# Patient Record
Sex: MALE | Race: WHITE
[De-identification: names, ages, dates, MRNs, and addresses within clinical notes are randomized per-mention and may not be internally consistent; named-entity substitution may affect disease eponyms.]

---

## 2023-11-24 ENCOUNTER — OFFICE VISIT (OUTPATIENT)
Dept: FAMILY MEDICINE CLINIC | Facility: CLINIC | Age: 28
End: 2023-11-24
Payer: COMMERCIAL

## 2023-11-24 VITALS
HEIGHT: 64 IN | WEIGHT: 143.8 LBS | HEART RATE: 72 BPM | BODY MASS INDEX: 24.55 KG/M2 | SYSTOLIC BLOOD PRESSURE: 132 MMHG | TEMPERATURE: 98.3 F | OXYGEN SATURATION: 100 % | DIASTOLIC BLOOD PRESSURE: 88 MMHG

## 2023-11-24 DIAGNOSIS — G47.9 SLEEP DISTURBANCE: Primary | ICD-10-CM

## 2023-11-24 LAB
ALBUMIN SERPL-MCNC: 3.7 G/DL (ref 3.5–5)
ALBUMIN/GLOB SERPL: 1.3 (ref 0.4–1.6)
ALP SERPL-CCNC: 73 U/L (ref 50–136)
ALT SERPL-CCNC: 37 U/L (ref 12–65)
ANION GAP SERPL CALC-SCNC: 4 MMOL/L (ref 2–11)
AST SERPL-CCNC: 27 U/L (ref 15–37)
BASOPHILS # BLD: 0 K/UL (ref 0–0.2)
BASOPHILS NFR BLD: 1 % (ref 0–2)
BILIRUB SERPL-MCNC: 0.2 MG/DL (ref 0.2–1.1)
BUN SERPL-MCNC: 23 MG/DL (ref 6–23)
CALCIUM SERPL-MCNC: 9.3 MG/DL (ref 8.3–10.4)
CHLORIDE SERPL-SCNC: 110 MMOL/L (ref 101–110)
CO2 SERPL-SCNC: 27 MMOL/L (ref 21–32)
CREAT SERPL-MCNC: 1.2 MG/DL (ref 0.8–1.5)
DIFFERENTIAL METHOD BLD: NORMAL
EOSINOPHIL # BLD: 0.1 K/UL (ref 0–0.8)
EOSINOPHIL NFR BLD: 2 % (ref 0.5–7.8)
ERYTHROCYTE [DISTWIDTH] IN BLOOD BY AUTOMATED COUNT: 13.1 % (ref 11.9–14.6)
FERRITIN SERPL-MCNC: 101 NG/ML (ref 8–388)
GLOBULIN SER CALC-MCNC: 2.9 G/DL (ref 2.8–4.5)
GLUCOSE SERPL-MCNC: 86 MG/DL (ref 65–100)
HCT VFR BLD AUTO: 42.9 % (ref 41.1–50.3)
HGB BLD-MCNC: 14.3 G/DL (ref 13.6–17.2)
IMM GRANULOCYTES # BLD AUTO: 0 K/UL (ref 0–0.5)
IMM GRANULOCYTES NFR BLD AUTO: 0 % (ref 0–5)
LYMPHOCYTES # BLD: 2 K/UL (ref 0.5–4.6)
LYMPHOCYTES NFR BLD: 32 % (ref 13–44)
MCH RBC QN AUTO: 30 PG (ref 26.1–32.9)
MCHC RBC AUTO-ENTMCNC: 33.3 G/DL (ref 31.4–35)
MCV RBC AUTO: 89.9 FL (ref 82–102)
MONOCYTES # BLD: 0.4 K/UL (ref 0.1–1.3)
MONOCYTES NFR BLD: 7 % (ref 4–12)
NEUTS SEG # BLD: 3.6 K/UL (ref 1.7–8.2)
NEUTS SEG NFR BLD: 58 % (ref 43–78)
NRBC # BLD: 0 K/UL (ref 0–0.2)
PLATELET # BLD AUTO: 317 K/UL (ref 150–450)
PMV BLD AUTO: 9.5 FL (ref 9.4–12.3)
POTASSIUM SERPL-SCNC: 3.8 MMOL/L (ref 3.5–5.1)
PROT SERPL-MCNC: 6.6 G/DL (ref 6.3–8.2)
RBC # BLD AUTO: 4.77 M/UL (ref 4.23–5.6)
SODIUM SERPL-SCNC: 141 MMOL/L (ref 133–143)
TSH W FREE THYROID IF ABNORMAL: 2.06 UIU/ML (ref 0.36–3.74)
WBC # BLD AUTO: 6.2 K/UL (ref 4.3–11.1)

## 2023-11-24 PROCEDURE — 99214 OFFICE O/P EST MOD 30 MIN: CPT | Performed by: PHYSICIAN ASSISTANT

## 2023-11-24 RX ORDER — TRAZODONE HYDROCHLORIDE 50 MG/1
50 TABLET ORAL NIGHTLY PRN
Qty: 30 TABLET | Refills: 2 | Status: SHIPPED | OUTPATIENT
Start: 2023-11-24

## 2023-11-24 SDOH — ECONOMIC STABILITY: FOOD INSECURITY: WITHIN THE PAST 12 MONTHS, THE FOOD YOU BOUGHT JUST DIDN'T LAST AND YOU DIDN'T HAVE MONEY TO GET MORE.: PATIENT DECLINED

## 2023-11-24 SDOH — ECONOMIC STABILITY: FOOD INSECURITY: WITHIN THE PAST 12 MONTHS, YOU WORRIED THAT YOUR FOOD WOULD RUN OUT BEFORE YOU GOT MONEY TO BUY MORE.: PATIENT DECLINED

## 2023-11-24 SDOH — ECONOMIC STABILITY: HOUSING INSECURITY
IN THE LAST 12 MONTHS, WAS THERE A TIME WHEN YOU DID NOT HAVE A STEADY PLACE TO SLEEP OR SLEPT IN A SHELTER (INCLUDING NOW)?: PATIENT REFUSED

## 2023-11-24 SDOH — ECONOMIC STABILITY: INCOME INSECURITY: HOW HARD IS IT FOR YOU TO PAY FOR THE VERY BASICS LIKE FOOD, HOUSING, MEDICAL CARE, AND HEATING?: PATIENT DECLINED

## 2023-11-24 ASSESSMENT — PATIENT HEALTH QUESTIONNAIRE - PHQ9
SUM OF ALL RESPONSES TO PHQ9 QUESTIONS 1 & 2: 0
1. LITTLE INTEREST OR PLEASURE IN DOING THINGS: 0
SUM OF ALL RESPONSES TO PHQ QUESTIONS 1-9: 0
SUM OF ALL RESPONSES TO PHQ QUESTIONS 1-9: 0
2. FEELING DOWN, DEPRESSED OR HOPELESS: 0
SUM OF ALL RESPONSES TO PHQ QUESTIONS 1-9: 0
SUM OF ALL RESPONSES TO PHQ QUESTIONS 1-9: 0

## 2023-11-24 ASSESSMENT — ENCOUNTER SYMPTOMS
SHORTNESS OF BREATH: 0
BLOOD IN STOOL: 0
COUGH: 0

## 2023-11-24 NOTE — PROGRESS NOTES
[address] [facility] [address]  Phone: [phone]  Fax [phone]  Provider : Gemma Thompson PA-C      Patient: Cici Tucker  YOB: 1995  Patient Age 34 y.o. Patient sex: male  Medical Record:  [id number]  Visit Date:11/24/2023   Author:  Sheila Leonard. [facility] Note     Chief complaint  Cici Tucker  is a 34 y.o. male who was seen on 11/27/23  8:33 AM  for the following reasons:    Chief Complaint   Patient presents with    New Patient     Has trouble with sleeping would like to discuss this- pt is not fasting-      New Patient   Current Medical problems addressed  Cookie Huang is 33 yo male who is  a new patient   He notes he works night shifts and has trouble falling asleep and sometimes taken unisom, melitonin and it helps sometimes. but struggles for hours to fall asleep. Denies snoring    ASSESSMENT AND PLAN    ICD-10-CM    1. Sleep disturbance  G47.9 CBC with Auto Differential     TSH with Reflex     Comprehensive Metabolic Panel     Ferritin     traZODone (DESYREL) 50 MG tablet     Ferritin     Comprehensive Metabolic Panel     TSH with Reflex     CBC with Auto Differential         Stephanie Felton was seen today for new patient. Diagnoses and all orders for this visit:    Sleep disturbance  -     CBC with Auto Differential; Future  -     TSH with Reflex; Future  -     Comprehensive Metabolic Panel; Future  -     Ferritin; Future  -     traZODone (DESYREL) 50 MG tablet; Take 1 tablet by mouth nightly as needed for Sleep  -     Ferritin  -     Comprehensive Metabolic Panel  -     TSH with Reflex  -     CBC with Auto Differential      Plan includes the following:  Reviewed sleep hygiene and recommended reducing caffeine intake and he notes he takes   No follow-up provider specified.   Orders Placed This Encounter   Procedures    CBC with Auto Differential     Standing Status:   Future     Number of Occurrences:   1     Standing Expiration

## 2023-12-09 ENCOUNTER — PATIENT MESSAGE (OUTPATIENT)
Dept: FAMILY MEDICINE CLINIC | Facility: CLINIC | Age: 28
End: 2023-12-09

## 2023-12-14 RX ORDER — TRAZODONE HYDROCHLORIDE 50 MG/1
75 TABLET ORAL NIGHTLY
Qty: 45 TABLET | Refills: 3 | Status: SHIPPED | OUTPATIENT
Start: 2023-12-14

## 2023-12-14 NOTE — TELEPHONE ENCOUNTER
From: Dre Godoy  To: Shanthi Burks  Sent: 12/9/2023 5:21 PM EST  Subject: My Trazodone medication     I had a check up a couple weeks ago and I was prescribed Trazodone for sleep. It works miracle but some night I'll take an addition melatonin or overcounter sleeping pill with it to aid my sleep.  My question is should I up my dosage and if so would I be prescribed for a little higher dosage

## 2024-02-20 RX ORDER — TRAZODONE HYDROCHLORIDE 50 MG/1
75 TABLET ORAL NIGHTLY
Qty: 45 TABLET | Refills: 3 | Status: SHIPPED | OUTPATIENT
Start: 2024-02-20

## 2024-04-16 RX ORDER — TRAZODONE HYDROCHLORIDE 50 MG/1
75 TABLET ORAL NIGHTLY
Qty: 45 TABLET | Refills: 3 | Status: SHIPPED | OUTPATIENT
Start: 2024-04-16

## 2024-06-17 DIAGNOSIS — G47.9 SLEEP DISTURBANCE: Primary | ICD-10-CM

## 2024-06-17 RX ORDER — TRAZODONE HYDROCHLORIDE 50 MG/1
75 TABLET ORAL NIGHTLY
Qty: 30 TABLET | Refills: 0 | Status: SHIPPED | OUTPATIENT
Start: 2024-06-17

## 2024-07-11 DIAGNOSIS — G47.9 SLEEP DISTURBANCE: ICD-10-CM

## 2024-07-11 RX ORDER — TRAZODONE HYDROCHLORIDE 50 MG/1
TABLET ORAL
Qty: 30 TABLET | Refills: 0 | Status: SHIPPED | OUTPATIENT
Start: 2024-07-11

## 2024-07-27 DIAGNOSIS — G47.9 SLEEP DISTURBANCE: ICD-10-CM

## 2024-07-29 DIAGNOSIS — G47.9 SLEEP DISTURBANCE: ICD-10-CM

## 2024-07-29 RX ORDER — TRAZODONE HYDROCHLORIDE 50 MG/1
TABLET, FILM COATED ORAL
Qty: 30 TABLET | Refills: 0 | OUTPATIENT
Start: 2024-07-29

## 2024-08-16 DIAGNOSIS — G47.9 SLEEP DISTURBANCE: ICD-10-CM

## 2024-08-16 RX ORDER — TRAZODONE HYDROCHLORIDE 50 MG/1
TABLET ORAL
Qty: 30 TABLET | Refills: 0 | Status: SHIPPED | OUTPATIENT
Start: 2024-08-16

## 2024-08-20 DIAGNOSIS — G47.9 SLEEP DISTURBANCE: ICD-10-CM

## 2024-08-20 RX ORDER — TRAZODONE HYDROCHLORIDE 50 MG/1
TABLET, FILM COATED ORAL
Qty: 30 TABLET | Refills: 0 | OUTPATIENT
Start: 2024-08-20

## 2024-09-30 DIAGNOSIS — G47.9 SLEEP DISTURBANCE: ICD-10-CM

## 2024-10-01 RX ORDER — TRAZODONE HYDROCHLORIDE 50 MG/1
TABLET, FILM COATED ORAL
Qty: 45 TABLET | Refills: 0 | Status: SHIPPED | OUTPATIENT
Start: 2024-10-01

## 2024-11-04 DIAGNOSIS — G47.9 SLEEP DISTURBANCE: ICD-10-CM

## 2024-11-04 RX ORDER — TRAZODONE HYDROCHLORIDE 50 MG/1
TABLET, FILM COATED ORAL
Qty: 45 TABLET | Refills: 0 | Status: SHIPPED | OUTPATIENT
Start: 2024-11-04

## 2024-11-13 DIAGNOSIS — G47.9 SLEEP DISTURBANCE: ICD-10-CM

## 2024-11-13 RX ORDER — TRAZODONE HYDROCHLORIDE 50 MG/1
TABLET, FILM COATED ORAL
Qty: 45 TABLET | Refills: 0 | OUTPATIENT
Start: 2024-11-13

## 2024-12-30 DIAGNOSIS — G47.9 SLEEP DISTURBANCE: ICD-10-CM

## 2024-12-30 RX ORDER — TRAZODONE HYDROCHLORIDE 50 MG/1
TABLET, FILM COATED ORAL
Qty: 45 TABLET | Refills: 0 | Status: SHIPPED | OUTPATIENT
Start: 2024-12-30

## 2024-12-30 NOTE — TELEPHONE ENCOUNTER
I sent the requested medication/product in. Please let patient know to check with their pharmacy  Thanks  Kirstin BALES    
Cooperative

## 2025-01-06 ENCOUNTER — PATIENT MESSAGE (OUTPATIENT)
Dept: FAMILY MEDICINE CLINIC | Facility: CLINIC | Age: 30
End: 2025-01-06

## 2025-01-08 NOTE — PROGRESS NOTES
[facility]  [address]   [place] [address]  Phone: [phone]  Fax [phone]  Provider : Kirstin Madrigal PA-C      Patient: Shemar Godoy  YOB: 1995  Patient Age 29 y.o.  Patient sex: male  Medical Record:  [id number]  Visit Date:1/9/2025   Author:  Kirstin Madrigal PA-C    Family Practice Clinic Note     Chief complaint  Shemar Godoy  is a 29 y.o. male who was seen on 01/09/25  10:21 AM  for the following reasons:    Chief Complaint   Patient presents with    Insomnia     Falling asleep, but not staying asleep       Current Medical problems addressed    todd is a 29-year-old male who returns today for follow-up for insomnia he was started on trazodone 50mg back in nov 2023 and then given increased dose of 1.5 tablets and struggling to sleep.  He works second shift so he was taking caffeine in the gym around 1pm and then again dose c4  (200mg of caffiene) energy drinks or capsules of caffeine and takes it around 5:00-5:30pm.  He lays down to sleep around 2am as he doesn't get off at 11pm or later if he works over.   He struggles to go to sleep   ASSESSMENT AND PLAN    ICD-10-CM    1. Primary insomnia  F51.01          Shemar \"Todd\" was seen today for insomnia.    Diagnoses and all orders for this visit:    Primary insomnia      Plan includes the following:  Advised to increase trazadone to 2tablets qhs and then try for 7-10 days and then if not helping can increase it to 3 tablet   He is using doxyalmine as sleep aid  Advised he can try magnesium glycinate.  Reviewed sleep hygiene and advised to avoid alcohol and reduce caffeine to 90mg at a time and cut off at least 10 hours prior to bedtime   No follow-up provider specified.  No orders of the defined types were placed in this encounter.      Past Medical History:  Allergies:No Known Allergies    Current Medications:   Medications marked \"taking\" at this time:  Current Outpatient Medications   Medication Sig

## 2025-01-09 ENCOUNTER — OFFICE VISIT (OUTPATIENT)
Dept: FAMILY MEDICINE CLINIC | Facility: CLINIC | Age: 30
End: 2025-01-09
Payer: COMMERCIAL

## 2025-01-09 VITALS
BODY MASS INDEX: 27.21 KG/M2 | SYSTOLIC BLOOD PRESSURE: 140 MMHG | WEIGHT: 159.4 LBS | TEMPERATURE: 98 F | OXYGEN SATURATION: 95 % | HEIGHT: 64 IN | HEART RATE: 80 BPM | DIASTOLIC BLOOD PRESSURE: 80 MMHG

## 2025-01-09 DIAGNOSIS — F51.01 PRIMARY INSOMNIA: Primary | ICD-10-CM

## 2025-01-09 PROCEDURE — 99213 OFFICE O/P EST LOW 20 MIN: CPT | Performed by: PHYSICIAN ASSISTANT

## 2025-01-09 RX ORDER — DIPHENHYDRAMINE HCL 25 MG
25 CAPSULE ORAL EVERY 6 HOURS PRN
COMMUNITY

## 2025-01-09 ASSESSMENT — PATIENT HEALTH QUESTIONNAIRE - PHQ9
1. LITTLE INTEREST OR PLEASURE IN DOING THINGS: NOT AT ALL
SUM OF ALL RESPONSES TO PHQ QUESTIONS 1-9: 0
SUM OF ALL RESPONSES TO PHQ9 QUESTIONS 1 & 2: 0
2. FEELING DOWN, DEPRESSED OR HOPELESS: NOT AT ALL
SUM OF ALL RESPONSES TO PHQ QUESTIONS 1-9: 0

## 2025-01-13 ASSESSMENT — ENCOUNTER SYMPTOMS
BLOOD IN STOOL: 0
COUGH: 0
SHORTNESS OF BREATH: 0

## 2025-01-20 DIAGNOSIS — G47.9 SLEEP DISTURBANCE: ICD-10-CM

## 2025-01-20 RX ORDER — TRAZODONE HYDROCHLORIDE 50 MG/1
TABLET ORAL
Qty: 45 TABLET | Refills: 0 | OUTPATIENT
Start: 2025-01-20

## 2025-01-30 ENCOUNTER — PATIENT MESSAGE (OUTPATIENT)
Dept: FAMILY MEDICINE CLINIC | Facility: CLINIC | Age: 30
End: 2025-01-30

## 2025-02-11 RX ORDER — TRAZODONE HYDROCHLORIDE 50 MG/1
TABLET, FILM COATED ORAL
Qty: 60 TABLET | Refills: 1 | Status: SHIPPED | OUTPATIENT
Start: 2025-02-11

## 2025-02-20 ENCOUNTER — OFFICE VISIT (OUTPATIENT)
Dept: FAMILY MEDICINE CLINIC | Facility: CLINIC | Age: 30
End: 2025-02-20
Payer: COMMERCIAL

## 2025-02-20 VITALS
DIASTOLIC BLOOD PRESSURE: 70 MMHG | BODY MASS INDEX: 26.61 KG/M2 | WEIGHT: 155 LBS | SYSTOLIC BLOOD PRESSURE: 118 MMHG | HEART RATE: 73 BPM | OXYGEN SATURATION: 99 % | TEMPERATURE: 98 F

## 2025-02-20 DIAGNOSIS — F51.01 PRIMARY INSOMNIA: Primary | ICD-10-CM

## 2025-02-20 LAB
ALBUMIN SERPL-MCNC: 4.3 G/DL (ref 3.5–5)
ALBUMIN/GLOB SERPL: 1.7 (ref 1–1.9)
ALP SERPL-CCNC: 63 U/L (ref 40–129)
ALT SERPL-CCNC: 19 U/L (ref 8–55)
ANION GAP SERPL CALC-SCNC: 9 MMOL/L (ref 7–16)
AST SERPL-CCNC: 22 U/L (ref 15–37)
BASOPHILS # BLD: 0.03 K/UL (ref 0–0.2)
BASOPHILS NFR BLD: 0.6 % (ref 0–2)
BILIRUB SERPL-MCNC: 0.5 MG/DL (ref 0–1.2)
BUN SERPL-MCNC: 14 MG/DL (ref 6–23)
CALCIUM SERPL-MCNC: 9.6 MG/DL (ref 8.8–10.2)
CHLORIDE SERPL-SCNC: 107 MMOL/L (ref 98–107)
CO2 SERPL-SCNC: 27 MMOL/L (ref 20–29)
CREAT SERPL-MCNC: 0.96 MG/DL (ref 0.8–1.3)
DIFFERENTIAL METHOD BLD: NORMAL
EOSINOPHIL # BLD: 0.06 K/UL (ref 0–0.8)
EOSINOPHIL NFR BLD: 1.2 % (ref 0.5–7.8)
ERYTHROCYTE [DISTWIDTH] IN BLOOD BY AUTOMATED COUNT: 12.3 % (ref 11.9–14.6)
FERRITIN SERPL-MCNC: 229 NG/ML (ref 8–388)
GLOBULIN SER CALC-MCNC: 2.5 G/DL (ref 2.3–3.5)
GLUCOSE SERPL-MCNC: 88 MG/DL (ref 70–99)
HCT VFR BLD AUTO: 43.3 % (ref 41.1–50.3)
HGB BLD-MCNC: 15.1 G/DL (ref 13.6–17.2)
IMM GRANULOCYTES # BLD AUTO: 0.01 K/UL (ref 0–0.5)
IMM GRANULOCYTES NFR BLD AUTO: 0.2 % (ref 0–5)
IRON SERPL-MCNC: 140 UG/DL (ref 35–100)
LYMPHOCYTES # BLD: 1.47 K/UL (ref 0.5–4.6)
LYMPHOCYTES NFR BLD: 28.3 % (ref 13–44)
MCH RBC QN AUTO: 29.9 PG (ref 26.1–32.9)
MCHC RBC AUTO-ENTMCNC: 34.9 G/DL (ref 31.4–35)
MCV RBC AUTO: 85.7 FL (ref 82–102)
MONOCYTES # BLD: 0.47 K/UL (ref 0.1–1.3)
MONOCYTES NFR BLD: 9.1 % (ref 4–12)
NEUTS SEG # BLD: 3.15 K/UL (ref 1.7–8.2)
NEUTS SEG NFR BLD: 60.6 % (ref 43–78)
NRBC # BLD: 0 K/UL (ref 0–0.2)
PLATELET # BLD AUTO: 275 K/UL (ref 150–450)
PMV BLD AUTO: 9.7 FL (ref 9.4–12.3)
POTASSIUM SERPL-SCNC: 4.6 MMOL/L (ref 3.5–5.1)
PROT SERPL-MCNC: 6.8 G/DL (ref 6.3–8.2)
RBC # BLD AUTO: 5.05 M/UL (ref 4.23–5.6)
SODIUM SERPL-SCNC: 142 MMOL/L (ref 136–145)
TSH, 3RD GENERATION: 1.07 UIU/ML (ref 0.27–4.2)
WBC # BLD AUTO: 5.2 K/UL (ref 4.3–11.1)

## 2025-02-20 PROCEDURE — 99213 OFFICE O/P EST LOW 20 MIN: CPT | Performed by: PHYSICIAN ASSISTANT

## 2025-02-20 RX ORDER — TRAZODONE HYDROCHLORIDE 50 MG/1
TABLET ORAL
Qty: 60 TABLET | Refills: 4 | Status: SHIPPED | OUTPATIENT
Start: 2025-02-20

## 2025-02-20 ASSESSMENT — ENCOUNTER SYMPTOMS
COUGH: 0
BLOOD IN STOOL: 0
SHORTNESS OF BREATH: 0

## 2025-02-20 ASSESSMENT — PATIENT HEALTH QUESTIONNAIRE - PHQ9
2. FEELING DOWN, DEPRESSED OR HOPELESS: NOT AT ALL
1. LITTLE INTEREST OR PLEASURE IN DOING THINGS: NOT AT ALL
SUM OF ALL RESPONSES TO PHQ QUESTIONS 1-9: 0
SUM OF ALL RESPONSES TO PHQ9 QUESTIONS 1 & 2: 0
SUM OF ALL RESPONSES TO PHQ QUESTIONS 1-9: 0

## 2025-02-20 NOTE — PROGRESS NOTES
[facility]  [address]   [place] [address]  Phone: [phone]  Fax [phone]  Provider : Kirstin Madrigal PA-C      Patient: Shemar Godoy  YOB: 1995  Patient Age 29 y.o.  Patient sex: male  Medical Record:  [id number]  Visit Date:2/20/2025   Author:  Kirstin Madrigal PA-C    Family Practice Clinic Note     Chief complaint  Shemar Godoy  is a 29 y.o. male who was seen on 02/20/25  1:05 PM  for the following reasons:    Chief Complaint   Patient presents with    Follow-up     Insomnia       Current Medical problems addressed  Insomnia   He is following sleep hygiene and using limited caffeine 12 hours prior to bed, and avoiding naps.   He has trouble waking up at night and trouble going back to sleep.  He is getting about about 6 hours.  He is using amitriptyline or trazadone and using benadrly and apigenin l thenine and magnesium glycinate  and been using some molassium       ASSESSMENT AND PLAN    ICD-10-CM    1. Primary insomnia  F51.01 CBC with Auto Differential     TSH     Comprehensive Metabolic Panel     Ferritin     Iron     Iron     Ferritin     Comprehensive Metabolic Panel     TSH     CBC with Auto Differential         Shemar \"Sathya\" was seen today for follow-up.    Diagnoses and all orders for this visit:    Primary insomnia  -     CBC with Auto Differential; Future  -     TSH; Future  -     Comprehensive Metabolic Panel; Future  -     Ferritin; Future  -     Iron; Future  -     Iron  -     Ferritin  -     Comprehensive Metabolic Panel  -     TSH  -     CBC with Auto Differential    Other orders  -     amitriptyline (ELAVIL) 25 MG tablet; Take 1 tablet by mouth nightly  -     traZODone (DESYREL) 50 MG tablet; Take 1-2 tablets po qhs      Plan includes the following:  Recommended cognitive shuffling game to help the body to rest.  Recommended 30 min  5x a week of cardiology   No follow-up provider specified.  Orders Placed This Encounter   Procedures    CBC

## 2025-02-25 ENCOUNTER — PATIENT MESSAGE (OUTPATIENT)
Dept: FAMILY MEDICINE CLINIC | Facility: CLINIC | Age: 30
End: 2025-02-25

## 2025-03-13 RX ORDER — TRAZODONE HYDROCHLORIDE 50 MG/1
TABLET ORAL
Qty: 60 TABLET | Refills: 4 | Status: SHIPPED | OUTPATIENT
Start: 2025-03-13

## 2025-03-13 NOTE — TELEPHONE ENCOUNTER
I sent the requested medication/product in. Please let patient know to check with their pharmacy  Thanks  Kirstin BALES     Walking

## 2025-03-13 NOTE — TELEPHONE ENCOUNTER
I sent the requested medication/product in. Please let patient know to check with their pharmacy  Thanks  Kirstin BALES

## 2025-04-09 ENCOUNTER — PATIENT MESSAGE (OUTPATIENT)
Dept: FAMILY MEDICINE CLINIC | Facility: CLINIC | Age: 30
End: 2025-04-09

## 2025-05-21 RX ORDER — TRAZODONE HYDROCHLORIDE 50 MG/1
TABLET ORAL
Qty: 60 TABLET | Refills: 4 | Status: SHIPPED | OUTPATIENT
Start: 2025-05-21

## 2025-06-19 ASSESSMENT — SLEEP AND FATIGUE QUESTIONNAIRES
HOW LIKELY ARE YOU TO NOD OFF OR FALL ASLEEP WHILE SITTING AND READING: SLIGHT CHANCE OF DOZING
HOW LIKELY ARE YOU TO NOD OFF OR FALL ASLEEP WHEN YOU ARE A PASSENGER IN A CAR FOR AN HOUR WITHOUT A BREAK: SLIGHT CHANCE OF DOZING
HOW LIKELY ARE YOU TO NOD OFF OR FALL ASLEEP WHILE SITTING QUIETLY AFTER LUNCH WITHOUT ALCOHOL: WOULD NEVER DOZE
HOW LIKELY ARE YOU TO NOD OFF OR FALL ASLEEP WHILE SITTING AND TALKING TO SOMEONE: WOULD NEVER DOZE
HOW LIKELY ARE YOU TO NOD OFF OR FALL ASLEEP WHEN YOU ARE A PASSENGER IN A CAR FOR AN HOUR WITHOUT A BREAK: SLIGHT CHANCE OF DOZING
HOW LIKELY ARE YOU TO NOD OFF OR FALL ASLEEP IN A CAR, WHILE STOPPED FOR A FEW MINUTES IN TRAFFIC: WOULD NEVER DOZE
HOW LIKELY ARE YOU TO NOD OFF OR FALL ASLEEP WHILE SITTING INACTIVE IN A PUBLIC PLACE: SLIGHT CHANCE OF DOZING
HOW LIKELY ARE YOU TO NOD OFF OR FALL ASLEEP WHILE LYING DOWN TO REST IN THE AFTERNOON WHEN CIRCUMSTANCES PERMIT: SLIGHT CHANCE OF DOZING
ESS TOTAL SCORE: 4
HOW LIKELY ARE YOU TO NOD OFF OR FALL ASLEEP WHILE SITTING AND TALKING TO SOMEONE: WOULD NEVER DOZE
HOW LIKELY ARE YOU TO NOD OFF OR FALL ASLEEP WHILE SITTING QUIETLY AFTER LUNCH WITHOUT ALCOHOL: WOULD NEVER DOZE
HOW LIKELY ARE YOU TO NOD OFF OR FALL ASLEEP WHILE WATCHING TV: WOULD NEVER DOZE
HOW LIKELY ARE YOU TO NOD OFF OR FALL ASLEEP WHILE SITTING AND READING: SLIGHT CHANCE OF DOZING
HOW LIKELY ARE YOU TO NOD OFF OR FALL ASLEEP WHILE SITTING INACTIVE IN A PUBLIC PLACE: SLIGHT CHANCE OF DOZING
HOW LIKELY ARE YOU TO NOD OFF OR FALL ASLEEP IN A CAR, WHILE STOPPED FOR A FEW MINUTES IN TRAFFIC: WOULD NEVER DOZE
HOW LIKELY ARE YOU TO NOD OFF OR FALL ASLEEP WHILE LYING DOWN TO REST IN THE AFTERNOON WHEN CIRCUMSTANCES PERMIT: SLIGHT CHANCE OF DOZING
HOW LIKELY ARE YOU TO NOD OFF OR FALL ASLEEP WHILE WATCHING TV: WOULD NEVER DOZE

## 2025-06-19 NOTE — PROGRESS NOTES
health score is 5/12 indicating poor quality sleep.            6/19/2025     3:01 PM   Sleep Medicine   Sitting and reading 1   Watching TV 0   Sitting, inactive in a public place (e.g. a theatre or a meeting) 1   As a passenger in a car for an hour without a break 1   Lying down to rest in the afternoon when circumstances permit 1   Sitting and talking to someone 0   Sitting quietly after a lunch without alcohol 0   In a car, while stopped for a few minutes in traffic 0   [place] Sleepiness Score 4        Patient-reported     SLEEP HEALTH   Regularity Do you go to bed and get out of bed at about the same times (within 1 hour) every day? 1 = Sometimes     Satisfaction   Are you satisfied with your sleep?  0 = Rarely / Never    Alertness Do you stay awake all day without dozing?  1 = Sometimes   Timing Are you asleep (or trying to sleep) between 2:00am and 4:00am?    1 = Sometimes     Efficiency Do you spend less than 30 minutes awake at night? (This includes the time it takes to fall asleep and awakenings from sleep.)  1 = Sometimes   Duration  Do you sleep between 6 and 8 hours per day?  1 = Sometimes   TOTAL  5      Total for all items range from 0-12                 Past Medical History:   Diagnosis Date    ADHD (attention deficit hyperactivity disorder)        Patient Active Problem List   Diagnosis    Persistent disorder of initiating or maintaining sleep    Non-restorative sleep    At risk for sleep apnea       History reviewed. No pertinent surgical history.    [unfilled]    Social History     Socioeconomic History    Marital status: Single     Spouse name: Not on file    Number of children: Not on file    Years of education: Not on file    Highest education level: Not on file   Occupational History    Not on file   Tobacco Use    Smoking status: Never    Smokeless tobacco: Never   Substance and Sexual Activity    Alcohol use: Not Currently     Alcohol/week: 12.0 standard drinks of alcohol     Types: 6

## 2025-06-20 ENCOUNTER — OFFICE VISIT (OUTPATIENT)
Dept: SLEEP MEDICINE | Age: 30
End: 2025-06-20

## 2025-06-20 VITALS
OXYGEN SATURATION: 99 % | HEART RATE: 81 BPM | BODY MASS INDEX: 27.66 KG/M2 | DIASTOLIC BLOOD PRESSURE: 84 MMHG | SYSTOLIC BLOOD PRESSURE: 128 MMHG | WEIGHT: 162 LBS | RESPIRATION RATE: 14 BRPM | HEIGHT: 64 IN

## 2025-06-20 DIAGNOSIS — Z91.89 AT RISK FOR SLEEP APNEA: ICD-10-CM

## 2025-06-20 DIAGNOSIS — G47.8 NON-RESTORATIVE SLEEP: ICD-10-CM

## 2025-06-20 DIAGNOSIS — G47.00 PERSISTENT DISORDER OF INITIATING OR MAINTAINING SLEEP: Primary | ICD-10-CM

## 2025-06-20 RX ORDER — TRAZODONE HYDROCHLORIDE 50 MG/1
TABLET ORAL
Qty: 60 TABLET | Refills: 4 | Status: SHIPPED | OUTPATIENT
Start: 2025-06-20